# Patient Record
Sex: MALE | Race: OTHER | URBAN - METROPOLITAN AREA
[De-identification: names, ages, dates, MRNs, and addresses within clinical notes are randomized per-mention and may not be internally consistent; named-entity substitution may affect disease eponyms.]

---

## 2019-12-10 ENCOUNTER — OFFICE VISIT (OUTPATIENT)
Dept: FAMILY MEDICINE CLINIC | Age: 29
End: 2019-12-10

## 2019-12-10 ENCOUNTER — HOSPITAL ENCOUNTER (OUTPATIENT)
Dept: LAB | Age: 29
Discharge: HOME | End: 2019-12-10

## 2019-12-10 VITALS
TEMPERATURE: 98.4 F | BODY MASS INDEX: 34.16 KG/M2 | HEIGHT: 65 IN | HEART RATE: 75 BPM | WEIGHT: 205 LBS | SYSTOLIC BLOOD PRESSURE: 143 MMHG | DIASTOLIC BLOOD PRESSURE: 94 MMHG

## 2019-12-10 DIAGNOSIS — R03.0 ELEVATED BLOOD-PRESSURE READING WITHOUT DIAGNOSIS OF HYPERTENSION: ICD-10-CM

## 2019-12-10 DIAGNOSIS — Z83.3 FAMILY HISTORY OF DIABETES MELLITUS (DM): ICD-10-CM

## 2019-12-10 DIAGNOSIS — Z23 ENCOUNTER FOR IMMUNIZATION: ICD-10-CM

## 2019-12-10 DIAGNOSIS — R63.5 WEIGHT GAIN: Primary | ICD-10-CM

## 2019-12-10 LAB
ALBUMIN SERPL-MCNC: 4.3 G/DL (ref 3.5–5)
ALBUMIN/GLOB SERPL: 1.2 {RATIO} (ref 1.1–2.2)
ALP SERPL-CCNC: 78 U/L (ref 45–117)
ALT SERPL-CCNC: 108 U/L (ref 12–78)
ANION GAP SERPL CALC-SCNC: 6 MMOL/L (ref 5–15)
AST SERPL-CCNC: 42 U/L (ref 15–37)
BILIRUB SERPL-MCNC: 0.6 MG/DL (ref 0.2–1)
BUN SERPL-MCNC: 12 MG/DL (ref 6–20)
BUN/CREAT SERPL: 12 (ref 12–20)
CALCIUM SERPL-MCNC: 9.2 MG/DL (ref 8.5–10.1)
CHLORIDE SERPL-SCNC: 106 MMOL/L (ref 97–108)
CO2 SERPL-SCNC: 28 MMOL/L (ref 21–32)
CREAT SERPL-MCNC: 1 MG/DL (ref 0.7–1.3)
EST. AVERAGE GLUCOSE BLD GHB EST-MCNC: 126 MG/DL
GLOBULIN SER CALC-MCNC: 3.5 G/DL (ref 2–4)
GLUCOSE SERPL-MCNC: 100 MG/DL (ref 65–100)
HBA1C MFR BLD: 6 % (ref 4–5.6)
POTASSIUM SERPL-SCNC: 4.5 MMOL/L (ref 3.5–5.1)
PROT SERPL-MCNC: 7.8 G/DL (ref 6.4–8.2)
SODIUM SERPL-SCNC: 140 MMOL/L (ref 136–145)
TSH SERPL DL<=0.05 MIU/L-ACNC: 1.74 UIU/ML (ref 0.36–3.74)

## 2019-12-10 PROCEDURE — 80053 COMPREHEN METABOLIC PANEL: CPT

## 2019-12-10 PROCEDURE — 84443 ASSAY THYROID STIM HORMONE: CPT

## 2019-12-10 PROCEDURE — 83036 HEMOGLOBIN GLYCOSYLATED A1C: CPT

## 2019-12-10 NOTE — PROGRESS NOTES
Assessment/Plan:    Diagnoses and all orders for this visit:    1. Weight gain  -     REFERRAL TO NUTRITION  -     HEMOGLOBIN A1C WITH EAG; Future  -     TSH 3RD GENERATION; Future    2. Elevated blood-pressure reading without diagnosis of hypertension  -     METABOLIC PANEL, COMPREHENSIVE; Future    3. Encounter for immunization    4. Family history of diabetes mellitus (DM)  -     HEMOGLOBIN A1C WITH EAG; Future        Follow-up and Dispositions    · Return in about 6 weeks (around 1/21/2020). Spencer Richards PA-C  [address] [place] [address] expressed understanding of this plan. An AVS was printed and given to the patient.      ----------------------------------------------------------------------    Chief Complaint   Patient presents with    Physical       History of Present Illness:  27 yo here for \"physical\". Since he and his wife were dealing with a lot of external stress in the past year, he has gained a lot of weight. He drinks sodas, eats fast food daily and does not exercise. He is an [place] speaker,  with no children. 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 He does not smoke. He has no past medical problems. His blood pressure is sometimes normal and sometimes elevated (his wife is a nurse at [employer] and she checks it for him). His mom has DM as do other family members  He has recently started to snore heavily since he gained the weight       No past medical history on file. No Known Allergies    Social History     Tobacco Use    Smoking status: Never Smoker    Smokeless tobacco: Never Used   Substance Use Topics    Alcohol use: Yes     Alcohol/week: 6.0 standard drinks     Types: 6 Cans of beer per week     Frequency: 2-4 times a month    Drug use: Never       No family history on file.     Physical Exam:     Visit Vitals  BP (!) 143/94 (BP 1 Location: Right arm, BP Patient Position: Sitting)   Pulse 75   Temp 98.4 °F (36.9 °C) (Oral)   Ht 5' 5.35\" (1.66 m)   Wt 205 lb (93 kg)   BMI 33.75 kg/m²       A&Ox3  WDWN NAD  Respirations normal and non labored  Cor RRR s1s2  LUngs CTA vincent

## 2019-12-10 NOTE — PROGRESS NOTES
[facility]  Requests flu vaccine. Denies fever and egg allergy. VIS information sheet given to patient. Explained possible s/e. Reviewed s/sx indicating need to be seen in ER. Patient had no adverse reaction at time of discharge. Entered into VIIS. GIVEN BY PATRICK GUPTA LPN.  Russ Moreno RN

## 2019-12-10 NOTE — PATIENT INSTRUCTIONS
Elevated Blood Pressure: Care Instructions Your Care Instructions Blood pressure is a measure of how hard the blood pushes against the walls of your arteries. It's normal for blood pressure to go up and down throughout the day. But if it stays up over time, you have high blood pressure. Two numbers tell you your blood pressure. The first number is the systolic pressure. It shows how hard the blood pushes when your heart is pumping. The second number is the diastolic pressure. It shows how hard the blood pushes between heartbeats, when your heart is relaxed and filling with blood. An ideal blood pressure in adults is less than 120/80 (say \"120 over 80\"). High blood pressure is 140/90 or higher. You have high blood pressure if your top number is 140 or higher or your bottom number is 90 or higher, or both. The main test for high blood pressure is simple, fast, and painless. To diagnose high blood pressure, your doctor will test your blood pressure at different times. After testing your blood pressure, your doctor may ask you to test it again when you are home. If you are diagnosed with high blood pressure, you can work with your doctor to make a long-term plan to manage it. Follow-up care is a key part of your treatment and safety. Be sure to make and go to all appointments, and call your doctor if you are having problems. It's also a good idea to know your test results and keep a list of the medicines you take. How can you care for yourself at home? · Do not smoke. Smoking increases your risk for heart attack and stroke. If you need help quitting, talk to your doctor about stop-smoking programs and medicines. These can increase your chances of quitting for good. · Stay at a healthy weight. · Try to limit how much sodium you eat to less than 2,300 milligrams (mg) a day. Your doctor may ask you to try to eat less than 1,500 mg a day. · Be physically active. Get at least 30 minutes of exercise on most days of the week. Walking is a good choice. You also may want to do other activities, such as running, swimming, cycling, or playing tennis or team sports. · Avoid or limit alcohol. Talk to your doctor about whether you can drink any alcohol. · Eat plenty of fruits, vegetables, and low-fat dairy products. Eat less saturated and total fats. · Learn how to check your blood pressure at home. When should you call for help? Call your doctor now or seek immediate medical care if: 
? · Your blood pressure is much higher than normal (such as 180/110 or higher). ? · You think high blood pressure is causing symptoms such as: ¨ Severe headache. ¨ Blurry vision. ? Watch closely for changes in your health, and be sure to contact your doctor if: 
? · You do not get better as expected. Where can you learn more? Go to http://elias-tank.info/. Enter X057 in the search box to learn more about \"Elevated Blood Pressure: Care Instructions. \" Current as of: September 21, 2016 Content Version: 11.4 © 5797-8444 [employer]. Care instructions adapted under license by [employer] (which disclaims liability or warranty for this information). If you have questions about a medical condition or this instruction, always ask your healthcare professional. [address] any warranty or liability for your use of this information.

## 2019-12-10 NOTE — PROGRESS NOTES
Check-out Note: Recheck bp in 6 weeks       AVS printed, given and reviewed. Patient aware that provider would like to follow up about today's visit in 6 weeks with an appt. As well as an appt. With ELDON ENNIS . This has been fully explained to the patient, who indicates understanding and agrees with plan. No further questions at this time. Floria Dancer, RN  [employer] from clinical information printed and given to the patient.

## 2019-12-24 DIAGNOSIS — R79.89 ELEVATED LFTS: Primary | ICD-10-CM

## 2019-12-24 NOTE — PROGRESS NOTES
Please call pt: your diabetes test shows that you have \"pre diabetes\" and you need to keep your appt with RD and f/up with PCP. Daily exercise and healthy diet will help. Before your next appt, we need to do additional testing on your liver. Please come in for lab work when you are fasting. Thank you.  The lab orders are in the chart

## 2019-12-27 NOTE — PROGRESS NOTES
Tc to the pt he was given the providers entire lab message. The pt was told he may come in for additional labs tests on his liver and cholesterol on Mon if he wishes. The CAV will be at [facility] on Mon 12/30/19. Pt told to come fasting in am 8:30-9:00 am. The pt verbalized understanding.   Fabián Avendano RN

## 2019-12-30 ENCOUNTER — CLINICAL SUPPORT (OUTPATIENT)
Dept: FAMILY MEDICINE CLINIC | Age: 29
End: 2019-12-30

## 2019-12-30 ENCOUNTER — LAB ONLY (OUTPATIENT)
Dept: FAMILY MEDICINE CLINIC | Age: 29
End: 2019-12-30

## 2019-12-30 ENCOUNTER — HOSPITAL ENCOUNTER (OUTPATIENT)
Dept: LAB | Age: 29
Discharge: HOME | End: 2019-12-30

## 2019-12-30 DIAGNOSIS — Z71.9 COUNSELED BY NURSE: Primary | ICD-10-CM

## 2019-12-30 DIAGNOSIS — R79.89 ELEVATED LFTS: ICD-10-CM

## 2019-12-30 DIAGNOSIS — R03.0 ELEVATED BLOOD-PRESSURE READING WITHOUT DIAGNOSIS OF HYPERTENSION: ICD-10-CM

## 2019-12-30 DIAGNOSIS — R63.5 WEIGHT GAIN: ICD-10-CM

## 2019-12-30 DIAGNOSIS — Z83.3 FAMILY HISTORY OF DIABETES MELLITUS (DM): ICD-10-CM

## 2019-12-30 LAB
ALBUMIN SERPL-MCNC: 4.1 G/DL (ref 3.5–5)
ALBUMIN/GLOB SERPL: 1.1 {RATIO} (ref 1.1–2.2)
ALP SERPL-CCNC: 77 U/L (ref 45–117)
ALT SERPL-CCNC: 85 U/L (ref 12–78)
ANION GAP SERPL CALC-SCNC: 3 MMOL/L (ref 5–15)
AST SERPL-CCNC: 33 U/L (ref 15–37)
BILIRUB SERPL-MCNC: 0.7 MG/DL (ref 0.2–1)
BUN SERPL-MCNC: 16 MG/DL (ref 6–20)
BUN/CREAT SERPL: 15 (ref 12–20)
CALCIUM SERPL-MCNC: 9 MG/DL (ref 8.5–10.1)
CHLORIDE SERPL-SCNC: 108 MMOL/L (ref 97–108)
CHOLEST SERPL-MCNC: 146 MG/DL
CO2 SERPL-SCNC: 29 MMOL/L (ref 21–32)
CREAT SERPL-MCNC: 1.08 MG/DL (ref 0.7–1.3)
EST. AVERAGE GLUCOSE BLD GHB EST-MCNC: 120 MG/DL
GLOBULIN SER CALC-MCNC: 3.6 G/DL (ref 2–4)
GLUCOSE SERPL-MCNC: 100 MG/DL (ref 65–100)
HBA1C MFR BLD: 5.8 % (ref 4–5.6)
HBV SURFACE AB SER QL: REACTIVE
HBV SURFACE AB SER-ACNC: 15.04 MIU/ML
HDLC SERPL-MCNC: 57 MG/DL
HDLC SERPL: 2.6 {RATIO} (ref 0–5)
LDLC SERPL CALC-MCNC: 72.6 MG/DL (ref 0–100)
LIPID PROFILE,FLP: NORMAL
POTASSIUM SERPL-SCNC: 4.3 MMOL/L (ref 3.5–5.1)
PROT SERPL-MCNC: 7.7 G/DL (ref 6.4–8.2)
SODIUM SERPL-SCNC: 140 MMOL/L (ref 136–145)
TRIGL SERPL-MCNC: 82 MG/DL (ref ?–150)
TSH SERPL DL<=0.05 MIU/L-ACNC: 1.24 UIU/ML (ref 0.36–3.74)
VLDLC SERPL CALC-MCNC: 16.4 MG/DL

## 2019-12-30 PROCEDURE — 83036 HEMOGLOBIN GLYCOSYLATED A1C: CPT

## 2019-12-30 PROCEDURE — 86706 HEP B SURFACE ANTIBODY: CPT

## 2019-12-30 PROCEDURE — 80053 COMPREHEN METABOLIC PANEL: CPT

## 2019-12-30 PROCEDURE — 84443 ASSAY THYROID STIM HORMONE: CPT

## 2019-12-30 PROCEDURE — 86803 HEPATITIS C AB TEST: CPT

## 2019-12-30 PROCEDURE — 80061 LIPID PANEL: CPT

## 2019-12-30 NOTE — PROGRESS NOTES
Pt was here today for a lab only appt. A1C, LIPID, CMP, TSH, HEP C AB AND HEB BE AG Were drawn from RA w/nocomplications by Mikayla Pugh.      Lani Cummings

## 2019-12-31 LAB
HCV AB SERPL QL IA: NONREACTIVE
HCV COMMENT,HCGAC: NORMAL

## 2019-12-31 NOTE — PROGRESS NOTES
Liver function tests are improved. Hep c is negative. Hep b ab present (immunity present), lipid panel is normal. Pt can have letter sent with this info.  Thank you

## 2019-12-31 NOTE — PROGRESS NOTES
Pt had his labs repeated on 12/24/19, and the provider had routed a message that it was ok to send the pt a lab letter with the results. A lab letter was mailed today.  Calin Holguin RN

## 2020-01-21 ENCOUNTER — OFFICE VISIT (OUTPATIENT)
Dept: FAMILY MEDICINE CLINIC | Age: 30
End: 2020-01-21

## 2020-01-21 VITALS
SYSTOLIC BLOOD PRESSURE: 137 MMHG | BODY MASS INDEX: 33.24 KG/M2 | DIASTOLIC BLOOD PRESSURE: 91 MMHG | TEMPERATURE: 98.1 F | WEIGHT: 199.52 LBS | HEIGHT: 65 IN | HEART RATE: 68 BPM

## 2020-01-21 VITALS — HEIGHT: 65 IN | BODY MASS INDEX: 32.59 KG/M2 | WEIGHT: 195.6 LBS

## 2020-01-21 DIAGNOSIS — F32.0 CURRENT MILD EPISODE OF MAJOR DEPRESSIVE DISORDER WITHOUT PRIOR EPISODE (HCC): ICD-10-CM

## 2020-01-21 DIAGNOSIS — R79.89 ELEVATED LFTS: ICD-10-CM

## 2020-01-21 DIAGNOSIS — Z71.3 DIETARY COUNSELING AND SURVEILLANCE: Primary | ICD-10-CM

## 2020-01-21 DIAGNOSIS — I10 ESSENTIAL HYPERTENSION: Primary | ICD-10-CM

## 2020-01-21 RX ORDER — LISINOPRIL AND HYDROCHLOROTHIAZIDE 10; 12.5 MG/1; MG/1
1 TABLET ORAL DAILY
Qty: 30 TAB | Refills: 2 | Status: SHIPPED | OUTPATIENT
Start: 2020-01-21 | End: 2020-03-24 | Stop reason: SDUPTHER

## 2020-01-21 NOTE — PROGRESS NOTES
The following was performed at discharge station per provider:   AVS printed, reviewed with pt and given to pt. REviewed new medication, Lisinopril-HCTZ. Pt has appts for follow up and lab prior to that. He will be called regarding appt with Tr Hayden. Pt expressed understanding of the above information. Jacquie Wu.

## 2020-01-21 NOTE — PROGRESS NOTES
Coordination of Care  1. Have you been to the ER, urgent care clinic since your last visit? Hospitalized since your last visit? No    2. Have you seen or consulted any other health care providers outside of the [address] [place] [address] since your last visit? Include any pap smears or colon screening. No    Does the patient need refills? NO    Learning Assessment Complete?  yes  Depression Screening complete in the past 12 months? yes

## 2020-01-21 NOTE — PROGRESS NOTES
DATE: 2020      REFERRING PHYSICIAN: Angelica Richards  NAME: Zenovia Cockayne : 1990 AGE: 26 y.o. GENDER: male  REASON FOR VISIT: overweight    ASSESSMENT:  Past Medical Hx: NA      LABS:   Lab Results   Component Value Date/Time    Hemoglobin A1c 5.8 (H) 2019 08:25 AM       MEDICATIONS/SUPPLEMENTS:     Prior to Admission medications    Not on File       FOOD ALLERGIES/INTOLERANCES: NA    ANTHROPOMETRICS:    Ht Readings from Last 1 Encounters:   20 5' 5\" (1.651 m)      Wt Readings from Last 1 Encounters:   20 195 lb 9.6 oz (88.7 kg)     IBW per pt 175 lbs   IBW: 136# +/- 10%  %IBW: 143 % +/- 10%    BMI: 32 Category: obesity class I    Reported Wt Hx: has recently gained a lot of weight    Estimated Nutritional Needs:     Reported Diet Hx:  Pt has made many changes since speaking with doctor. Decreased/eliminated all soda and sweet tea and increased water intake. Has decreased fast food intake and making better choices at restaurants. Exercise/Physical Actvity:      Gym 3-4 running 5-6 miles      Environmental/Social:    Construction FT  Lives with wife who is a nurse and is supportive of dietary changes  Pt expresses feeling waves of depression/anxiety, shares a lot of his past with RD      NUTRITION INTERVENTION:    Introduced MyPlate and discussed how it can help to ensure both balance and variety. Reviewed the food groups and what each group contributes to our bodies. Using food models, RD demonstrated appropriate portion sizes. [facility]    PATIENT GOALS:      Specific tips and techniques to facilitate compliance with above recommendations were provided and discussed. Pt was strongly encouraged to begin making necessary changes now, and re-visit the dietitian prn.             Katelynn Gupta RD

## 2020-01-21 NOTE — PATIENT INSTRUCTIONS

## 2020-01-21 NOTE — PROGRESS NOTES
Assessment/Plan:    Diagnoses and all orders for this visit:    1. Essential hypertension  -     lisinopril-hydroCHLOROthiazide (PRINZIDE, ZESTORETIC) 10-12.5 mg per tablet; Take 1 Tab by mouth daily. 2. Current mild episode of major depressive disorder without prior episode ([address]  -     REFERRAL TO BEHAVIORAL HEALTH    Pt has made considerable lifestyle changes in the past 6 weeks but his bp readings at home (his wife is RN) are still elevated. Usually 044'O systolic he states. Will start lis/hctz and recheck in 8 weeks, he will keep log. He prefers f/up in 8 weeks due to having f/up with dietician same day    He has been thinking about my offer for counseling due to his many stressors currently and he asks today for me to refer him. I am happy to do so   Repeat cmp before next appt- had elevated LFT at last check but has cut back on ETOH     Follow-up and Dispositions    · Return in about 2 months (around 3/21/2020) for recheck bp with me. Linda Richards PA-C  [address] [place] [address] expressed understanding of this plan. An AVS was printed and given to the patient.      ----------------------------------------------------------------------    Chief Complaint   Patient presents with    Hypertension     f/u,        History of Present Illness:    Pt here for f/up on elevated bp. He has made many lifestyle changes since his last appt here - cutting out sodas, starting at the gym again and in general he feels much better. He states that during the week, he is able to eat healthy and exercise but on the weekends he has found it harder to follow a healthy lifestyle. We talked about finding a balance and he agrees to work on this. He has no chest pain.  He is running on the treadmill and feels good, no problem  He has cut back on ETOH  He asks for the referral to [facility] as he continues to have stress and mild depression over being  from his family (his father has had a mild stroke since he was here in 12/19). Other stressors as indicated in my last note continue to bother him too     No past medical history on file. Current Outpatient Medications   Medication Sig Dispense Refill    lisinopril-hydroCHLOROthiazide (PRINZIDE, ZESTORETIC) 10-12.5 mg per tablet Take 1 Tab by mouth daily. 30 Tab 2       No Known Allergies    Social History     Tobacco Use    Smoking status: Never Smoker    Smokeless tobacco: Never Used   Substance Use Topics    Alcohol use: Yes     Alcohol/week: 6.0 standard drinks     Types: 6 Cans of beer per week     Frequency: 2-4 times a month    Drug use: Never       No family history on file. Physical Exam:     Visit Vitals  BP (!) 137/91 (BP 1 Location: Right arm, BP Patient Position: Sitting)   Pulse 68   Temp 98.1 °F (36.7 °C) (Oral)   Ht 5' 5\" (1.651 m)   Wt 199 lb 8.3 oz (90.5 kg)   BMI 33.20 kg/m²       A&Ox3  WDWN NAD  Respirations normal and non labored  Lab Results   Component Value Date/Time    Cholesterol, total 146 12/30/2019 08:25 AM    HDL Cholesterol 57 12/30/2019 08:25 AM    LDL, calculated 72.6 12/30/2019 08:25 AM    VLDL, calculated 16.4 12/30/2019 08:25 AM    Triglyceride 82 12/30/2019 08:25 AM    CHOL/HDL Ratio 2.6 12/30/2019 08:25 AM     Lab Results   Component Value Date/Time    Hemoglobin A1c 5.8 (H) 12/30/2019 08:25 AM     Lab Results   Component Value Date/Time    Sodium 140 12/30/2019 08:25 AM    Potassium 4.3 12/30/2019 08:25 AM    Chloride 108 12/30/2019 08:25 AM    CO2 29 12/30/2019 08:25 AM    Anion gap 3 (L) 12/30/2019 08:25 AM    Glucose 100 12/30/2019 08:25 AM    BUN 16 12/30/2019 08:25 AM    Creatinine 1.08 12/30/2019 08:25 AM    BUN/Creatinine ratio 15 12/30/2019 08:25 AM    GFR est AA >60 12/30/2019 08:25 AM    GFR est non-AA >60 12/30/2019 08:25 AM    Calcium 9.0 12/30/2019 08:25 AM    Bilirubin, total 0.7 12/30/2019 08:25 AM    AST (SGOT) 33 12/30/2019 08:25 AM    Alk.  phosphatase 77 12/30/2019 08:25 AM    Protein, total 7.7 12/30/2019 08:25 AM Albumin 4.1 12/30/2019 08:25 AM    Globulin 3.6 12/30/2019 08:25 AM    A-G Ratio 1.1 12/30/2019 08:25 AM    ALT (SGPT) 85 (H) 12/30/2019 08:25 AM     Lab Results   Component Value Date/Time    TSH 1.24 12/30/2019 08:25 AM

## 2020-01-28 ENCOUNTER — OFFICE VISIT (OUTPATIENT)
Dept: FAMILY MEDICINE CLINIC | Age: 30
End: 2020-01-28

## 2020-01-28 DIAGNOSIS — F43.21 ADJUSTMENT DISORDER WITH DEPRESSED MOOD: Primary | ICD-10-CM

## 2020-02-18 NOTE — PROGRESS NOTES
INITIAL ASSESSMENT    Self referred by provider requesting counseling to help with managing stressors. He has been in [facility] for 15 years. Experiencing a lot of grief with being apart from his aunt and uncle (who were like parents for him) who are aging and have concerning health needs. He has history of drinking that interfered with reaching his goals, but he recognized this and made significant changes in his behaviors. Got his GED; has been  for seven years; built a house; has been working in his current job for nine year; and has been trying for three years to get his citizenship; always gets denied. He has had some difficulty regarding a tattoo he has that has prevented him from getting approved by INS as they see it related to gang activity, which he denies. This issue causes him a great deal of sadness and frustration and causes him to lose hope. He identifies his wife as a strong support that helps him avoid depression. Clinician provided opportunity for emotional expression in session and encouraged him to use coping skills he had identified. He did not feel he needed a follow up appointment, but agreed to call if things changed.  He continues to see his Provider and the nutritionist.

## 2020-03-24 ENCOUNTER — OFFICE VISIT (OUTPATIENT)
Dept: FAMILY MEDICINE CLINIC | Age: 30
End: 2020-03-24

## 2020-03-24 VITALS — WEIGHT: 194.5 LBS | DIASTOLIC BLOOD PRESSURE: 67 MMHG | SYSTOLIC BLOOD PRESSURE: 127 MMHG | BODY MASS INDEX: 32.37 KG/M2

## 2020-03-24 VITALS — WEIGHT: 194.5 LBS | BODY MASS INDEX: 32.37 KG/M2

## 2020-03-24 DIAGNOSIS — Z71.3 DIETARY COUNSELING AND SURVEILLANCE: Primary | ICD-10-CM

## 2020-03-24 DIAGNOSIS — I10 ESSENTIAL HYPERTENSION: ICD-10-CM

## 2020-03-24 RX ORDER — LISINOPRIL AND HYDROCHLOROTHIAZIDE 10; 12.5 MG/1; MG/1
1 TABLET ORAL DAILY
Qty: 90 TAB | Refills: 3 | Status: SHIPPED | OUTPATIENT
Start: 2020-03-24 | End: 2021-07-01

## 2020-03-24 NOTE — PROGRESS NOTES
Coordination of Care  1. Have you been to the ER, urgent care clinic since your last visit? Hospitalized since your last visit? No    2. Have you seen or consulted any other health care providers outside of the [address] [place] [address] since your last visit? Include any pap smears or colon screening. No    Does the patient need refills?  No

## 2020-03-24 NOTE — PROGRESS NOTES
Assessment/Plan:    Diagnoses and all orders for this visit:    1. Essential hypertension  -     lisinopril-hydroCHLOROthiazide (PRINZIDE, ZESTORETIC) 10-12.5 mg per tablet; Take 1 Tab by mouth daily. Follow-up and Dispositions    · Return in about 6 months (around 9/24/2020). Sobia Richards PA-C  [facility] expressed understanding of this plan. An AVS was printed and given to the patient.      ----------------------------------------------------------------------    Chief Complaint   Patient presents with    Follow-up     HTN       History of Present Illness:  Virtual visit with pt: reason scheduled appt to f/u on HTN and anxiety/ stress  He states that he is doing well with his bp medication, his wife has been taking his bp every other day and he has had all normal numbers in the past month. He had a little drowsiness the first month with the new medication but since then it has resolved. He denies chest pain, SoB, MARROQUIN, extremity swelling  He has had a phone visit with the RD today which he found helpful  He states that he has not been feeling stressed recently like before. All things seem a little better now he states  We discussed the precautions and recommendations about COVID and what he should do since his wife works in the hospital. He is aware and is following the recommendations. We decided that we would go for 6 month f/up due to the limitations in scheduling we have now but that he was encouraged to sign up for my chart and to let me know if there is anything that he needs in the meantime       No past medical history on file. Current Outpatient Medications   Medication Sig Dispense Refill    lisinopril-hydroCHLOROthiazide (PRINZIDE, ZESTORETIC) 10-12.5 mg per tablet Take 1 Tab by mouth daily.  90 Tab 3       No Known Allergies    Social History     Tobacco Use    Smoking status: Never Smoker    Smokeless tobacco: Never Used   Substance Use Topics    Alcohol use: Yes     Alcohol/week: 6.0 standard drinks     Types: 6 Cans of beer per week     Frequency: 2-4 times a month    Drug use: Never       No family history on file.     Physical Exam:     Visit Vitals  /67   Wt 194 lb 8 oz (88.2 kg)   BMI 32.37 kg/m²       A&Ox3  WDWN NAD  Respirations normal and non labored

## 2020-03-24 NOTE — PROGRESS NOTES
Spoke with pt via phone. Pt states he speaks [place]. Reviewed intake questions with pt and med rec completed.  Bin Cantu RN

## 2020-03-24 NOTE — PROGRESS NOTES
DATE: 3/24/2020      REFERRING PROVIDER: SHAYY Randhawa  NAME: Pari Murguia : 1990 AGE: 26 y.o. GENDER: male  REASON FOR VISIT: Weight Management     ASSESSMENT: He shares that he has been taking blood pressure medication. He shares that he was able to meet with the counselor, and that things are better now. He has been trying to check what he eats and he was going to the gym (before they were closed for COVID-19). He shares he can feel the changes in his body. Past Medical Hx: Essential Hypertension, Obesity    LABS:   Lab Results   Component Value Date/Time    Hemoglobin A1c 5.8 (H) 2019 08:25 AM     Last Home Blood Pressure Check: 127/67     MEDICATIONS/SUPPLEMENTS:   [unfilled]  Prior to Admission medications    Medication Sig Start Date End Date Taking? Authorizing Provider   lisinopril-hydroCHLOROthiazide (PRINZIDE, ZESTORETIC) 10-12.5 mg per tablet Take 1 Tab by mouth daily. 20   Nancy Richards PA       ANTHROPOMETRICS:    Body mass index is 32.37 kg/m². Category: Obese Class I    Wt Readings from Last 4 Encounters:   20 194 lb 8 oz (88.2 kg)   20 199 lb 8.3 oz (90.5 kg)   20 195 lb 9.6 oz (88.7 kg)   12/10/19 205 lb (93 kg)       Reported Diet Hx:  Since his first visit with Miguel Barbosa RD he has cut out drinking soda, most fried foods, eating out, and he has been trying to eat more vegetables. Current Exercise/Physical Activity:    Has been walking around his neighboorhood for 1 hour 3 times a week. 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 Intervention:  Encouraged and affirmed patient of his progress towards healthy eating and weight loss goals. Encouraged patient to continue making changes and helped to come up with a target goal date.       Patient Goals:  - Goal weight of 170 lbs by 2020    Specific tips and techniques to facilitate compliance with above recommendations were provided and discussed. Pt was strongly encouraged to begin making necessary changes now. Will follow-up with Jessa Stanley in 2 month to review progress towards goals.           Shahana Coles RD

## 2020-04-28 ENCOUNTER — TELEPHONE (OUTPATIENT)
Dept: FAMILY MEDICINE CLINIC | Age: 30
End: 2020-04-28

## 2020-04-28 ENCOUNTER — DOCUMENTATION ONLY (OUTPATIENT)
Dept: FAMILY MEDICINE CLINIC | Age: 30
End: 2020-04-28

## 2021-06-29 DIAGNOSIS — I10 ESSENTIAL HYPERTENSION: ICD-10-CM

## 2021-07-01 RX ORDER — LISINOPRIL AND HYDROCHLOROTHIAZIDE 10; 12.5 MG/1; MG/1
TABLET ORAL
Qty: 90 TABLET | Refills: 0 | Status: SHIPPED | OUTPATIENT
Start: 2021-07-01 | End: 2021-07-21 | Stop reason: SDUPTHER

## 2021-07-09 NOTE — TELEPHONE ENCOUNTER
Received a refill request for the pt for th emedication Lisinopril / HCTZ 10-12.5 mg. This was refilled on 07/01/21, #90, 0 refills.  Sacha Glasgow RN

## 2021-07-21 ENCOUNTER — HOSPITAL ENCOUNTER (OUTPATIENT)
Dept: LAB | Age: 31
Discharge: HOME | End: 2021-07-21

## 2021-07-21 ENCOUNTER — OFFICE VISIT (OUTPATIENT)
Dept: FAMILY MEDICINE CLINIC | Age: 31
End: 2021-07-21

## 2021-07-21 VITALS
SYSTOLIC BLOOD PRESSURE: 118 MMHG | DIASTOLIC BLOOD PRESSURE: 78 MMHG | WEIGHT: 197 LBS | HEIGHT: 65 IN | OXYGEN SATURATION: 98 % | TEMPERATURE: 98.4 F | HEART RATE: 77 BPM | BODY MASS INDEX: 32.82 KG/M2

## 2021-07-21 DIAGNOSIS — E66.3 OVERWEIGHT, PEDIATRIC: ICD-10-CM

## 2021-07-21 DIAGNOSIS — I10 ESSENTIAL HYPERTENSION: ICD-10-CM

## 2021-07-21 DIAGNOSIS — R06.83 SNORING: ICD-10-CM

## 2021-07-21 DIAGNOSIS — I10 ESSENTIAL HYPERTENSION: Primary | ICD-10-CM

## 2021-07-21 PROCEDURE — 99213 OFFICE O/P EST LOW 20 MIN: CPT | Performed by: PHYSICIAN ASSISTANT

## 2021-07-21 PROCEDURE — 80061 LIPID PANEL: CPT

## 2021-07-21 PROCEDURE — 83036 HEMOGLOBIN GLYCOSYLATED A1C: CPT

## 2021-07-21 PROCEDURE — 80053 COMPREHEN METABOLIC PANEL: CPT

## 2021-07-21 RX ORDER — LISINOPRIL AND HYDROCHLOROTHIAZIDE 10; 12.5 MG/1; MG/1
TABLET ORAL
Qty: 90 TABLET | Refills: 3 | Status: SHIPPED | OUTPATIENT
Start: 2021-07-21 | End: 2022-08-24 | Stop reason: SDUPTHER

## 2021-07-21 NOTE — PATIENT INSTRUCTIONS
Snoring: Care Instructions  Your Care Instructions  Snoring is a noise that you may make while breathing during sleep. You snore when the flow of air from your mouth or nose to your lungs makes the tissues of your throat vibrate while you sleep. This usually is caused by a blockage or narrowing in your nose, mouth, or throat (airway). Snoring can be soft, loud, raspy, harsh, hoarse, or fluttering. Your bed partner may notice that you sleep with your mouth open and that you are restless while sleeping. If snoring interferes with your or your bed partner's sleep, either or both of you may feel tired during the day. You may be able to help reduce your snoring by making changes in your activities and in the way you sleep. Follow-up care is a key part of your treatment and safety. Be sure to make and go to all appointments, and call your doctor if you are having problems. It's also a good idea to know your test results and keep a list of the medicines you take. How can you care for yourself at home? · Lose weight, if needed. Many people who snore are overweight. Weight loss can help reduce the narrowing of the airway and might reduce or stop snoring. · Limit the use of alcohol and medicines. Drinking a lot of alcohol or taking certain medicines, especially sleeping pills or tranquilizers, before sleep may make snoring worse. · Go to bed at the same time each night, and get plenty of sleep. You may snore more when you have not had enough sleep. · Sleep on your side. Sleeping on your side may stop snoring. Try sewing a pocket in the middle of the back of your [employer] top, putting a tennis ball into the pocket, and stitching it closed. This will help keep you from sleeping on your back. · Treat breathing problems. For example, a blocked or stuffy nose caused by colds or allergies can disturb airflow. This can lead to snoring. · Use a device that helps keep your airway open during sleep.  This could be a device that you put in your mouth. Other examples include strips or disks that you use on your nose. · Do not smoke. Smoking can make snoring worse. If you need help quitting, talk to your doctor about stop-smoking programs and medicines. These can increase your chances of quitting for good. · Raise the head of your bed 4 to 6 inches by putting bricks under the legs of the bed. This may prevent your tongue from falling toward the back of the throat, which can make a blocked or narrow airway worse. Putting pillows under your head will not help. When should you call for help? Watch closely for changes in your health, and be sure to contact your doctor if:    · You snore, and you feel sleepy during the day.     · Your sleeping partner or you notice that you gasp, choke, or stop breathing during sleep.     · You do not get better as expected. Where can you learn more? Go to http://www.gray.com/  Enter J563 in the search box to learn more about \"Snoring: Care Instructions. \"  Current as of: October 26, 2020               Content Version: 12.8  © 7043-4061 [employer]. Care instructions adapted under license by [employer] (which disclaims liability or warranty for this information). If you have questions about a medical condition or this instruction, always ask your healthcare professional. [address] any warranty or liability for your use of this information.

## 2021-07-21 NOTE — PROGRESS NOTES
Coordination of Care  1. Have you been to the ER, urgent care clinic since your last visit? Hospitalized since your last visit? No    2. Have you seen or consulted any other health care providers outside of the [address] [place] [address] since your last visit? Include any pap smears or colon screening. No    Does the patient need refills? N/A    Learning Assessment Complete?  yes

## 2021-07-21 NOTE — PROGRESS NOTES
Assessment/Plan:    Diagnoses and all orders for this visit:    1. Essential hypertension  -     LIPID PANEL; Future  -     METABOLIC PANEL, COMPREHENSIVE; Future  -     lisinopril-hydroCHLOROthiazide (PRINZIDE, ZESTORETIC) 10-12.5 mg per tablet; Take 1 tablet by mouth once daily    2. Overweight, pediatric  -     HEMOGLOBIN A1C WITH EAG; Future    3. Snoring    Pt is working on weight loss, he has intentionally lost about 20 lbs that he gained during [place] by cutting out sodas and sweet tea  He will let me know if this is sufficient to help his wife with his increased snoring, if not the next step would be sleep eval study      Follow-up and Dispositions    · Return in about 1 year (around 7/21/2022). Dane Richards PA-C  [facility] expressed understanding of this plan. An AVS was printed and given to the patient.      ----------------------------------------------------------------------    Chief Complaint   Patient presents with    Hypertension     f/u       History of Present Illness:  Pt presents for htn check up. He has been well during [place] pandemic, having had his COVID vaccines in 4/21. He has continued with his lis/hctz taking one daily. His wife, a nurse, checks his bp and it has always been normal, no higher then 125 over 70's since he started lis/hctz. He also noted that the initial chest pressure that he felt before starting the medication resolved after one week on the med. He has no side effects to the med  He stopped exercising and was only working 3 days a week during [place]. Combined with cooking and eating more, he gained >20 lbs and recently has been able to lose a lot of that weight. He cut out sweet drinks and is watching what he is eating. Recently, he started to exercise again    He has no chest pain, SOB, MARROQUIN or extremity swelling  His wife asks him to bring up in today's visit his increased snoring. It is worse when he is lying supine.  He thinks that it is improved since losing some of the weight. He will let me know if this problem persists       No past medical history on file. Current Outpatient Medications   Medication Sig Dispense Refill    lisinopril-hydroCHLOROthiazide (PRINZIDE, ZESTORETIC) 10-12.5 mg per tablet Take 1 tablet by mouth once daily 90 Tablet 3       No Known Allergies    Social History     Tobacco Use    Smoking status: Never Smoker    Smokeless tobacco: Never Used   Substance Use Topics    Alcohol use: Yes     Alcohol/week: 6.0 standard drinks     Types: 6 Cans of beer per week    Drug use: Never       No family history on file. Physical Exam:     Visit Vitals  /78 (BP 1 Location: Right upper arm, BP Patient Position: Sitting)   Pulse 77   Temp 98.4 °F (36.9 °C) (Temporal)   Ht 5' 4.96\" (1.65 m)   Wt 197 lb (89.4 kg)   SpO2 98%   BMI 32.82 kg/m²     Looks well, pleasant   A&Ox3  WDWN NAD  Respirations normal and non labored  Lab Results   Component Value Date/Time    Sodium 140 12/30/2019 08:25 AM    Potassium 4.3 12/30/2019 08:25 AM    Chloride 108 12/30/2019 08:25 AM    CO2 29 12/30/2019 08:25 AM    Anion gap 3 (L) 12/30/2019 08:25 AM    Glucose 100 12/30/2019 08:25 AM    BUN 16 12/30/2019 08:25 AM    Creatinine 1.08 12/30/2019 08:25 AM    BUN/Creatinine ratio 15 12/30/2019 08:25 AM    GFR est AA >60 12/30/2019 08:25 AM    GFR est non-AA >60 12/30/2019 08:25 AM    Calcium 9.0 12/30/2019 08:25 AM    Bilirubin, total 0.7 12/30/2019 08:25 AM    Alk.  phosphatase 77 12/30/2019 08:25 AM    Protein, total 7.7 12/30/2019 08:25 AM    Albumin 4.1 12/30/2019 08:25 AM    Globulin 3.6 12/30/2019 08:25 AM    A-G Ratio 1.1 12/30/2019 08:25 AM    ALT (SGPT) 85 (H) 12/30/2019 08:25 AM    AST (SGOT) 33 12/30/2019 08:25 AM     Lab Results   Component Value Date/Time    Cholesterol, total 146 12/30/2019 08:25 AM    HDL Cholesterol 57 12/30/2019 08:25 AM    LDL, calculated 72.6 12/30/2019 08:25 AM    VLDL, calculated 16.4 12/30/2019 08:25 AM Triglyceride 82 12/30/2019 08:25 AM    CHOL/HDL Ratio 2.6 12/30/2019 08:25 AM

## 2021-07-21 NOTE — PROGRESS NOTES
I have printed AVS and reviewed it with patient today. Reviewed RX script for medication(s)  and told pt prescription(s) should be ready for  at pharmacy in approximately 2 hrs. Yumiko Hernandez RN           .

## 2021-07-22 LAB
ALBUMIN SERPL-MCNC: 4 G/DL (ref 3.5–5)
ALBUMIN/GLOB SERPL: 1.1 {RATIO} (ref 1.1–2.2)
ALP SERPL-CCNC: 83 U/L (ref 45–117)
ALT SERPL-CCNC: 55 U/L (ref 12–78)
ANION GAP SERPL CALC-SCNC: 7 MMOL/L (ref 5–15)
AST SERPL-CCNC: 23 U/L (ref 15–37)
BILIRUB SERPL-MCNC: 0.4 MG/DL (ref 0.2–1)
BUN SERPL-MCNC: 19 MG/DL (ref 6–20)
BUN/CREAT SERPL: 20 (ref 12–20)
CALCIUM SERPL-MCNC: 9 MG/DL (ref 8.5–10.1)
CHLORIDE SERPL-SCNC: 107 MMOL/L (ref 97–108)
CHOLEST SERPL-MCNC: 162 MG/DL
CO2 SERPL-SCNC: 26 MMOL/L (ref 21–32)
CREAT SERPL-MCNC: 0.94 MG/DL (ref 0.7–1.3)
EST. AVERAGE GLUCOSE BLD GHB EST-MCNC: 123 MG/DL
GLOBULIN SER CALC-MCNC: 3.6 G/DL (ref 2–4)
GLUCOSE SERPL-MCNC: 104 MG/DL (ref 65–100)
HBA1C MFR BLD: 5.9 % (ref 4–5.6)
HDLC SERPL-MCNC: 54 MG/DL
HDLC SERPL: 3 {RATIO} (ref 0–5)
LDLC SERPL CALC-MCNC: 84 MG/DL (ref 0–100)
POTASSIUM SERPL-SCNC: 4.3 MMOL/L (ref 3.5–5.1)
PROT SERPL-MCNC: 7.6 G/DL (ref 6.4–8.2)
SODIUM SERPL-SCNC: 140 MMOL/L (ref 136–145)
TRIGL SERPL-MCNC: 120 MG/DL (ref ?–150)
VLDLC SERPL CALC-MCNC: 24 MG/DL

## 2021-07-22 NOTE — PROGRESS NOTES
Letter mailed to pt with labs and message from provider. Patient given phone number of call back nurse to call if patient wants to discuss message in letter from provider. This was a request from [facility] to mail patient this message: SHAYY Villanueva sent to [facility]  Please send note: your cholesterol remains normal. Your blood sugar check remains in the \"pre diabetic\" range so continue to work on weight loss with healthy lifestyle changes.  Your kidney and liver tests are all normal. Thank you   Scott Ramos RN

## 2021-07-22 NOTE — PROGRESS NOTES
Please send note: your cholesterol remains normal. Your blood sugar check remains in the \"pre diabetic\" range so continue to work on weight loss with healthy lifestyle changes.  Your kidney and liver tests are all normal. Thank you

## 2022-08-17 DIAGNOSIS — I10 ESSENTIAL HYPERTENSION: ICD-10-CM

## 2022-08-22 RX ORDER — LISINOPRIL AND HYDROCHLOROTHIAZIDE 10; 12.5 MG/1; MG/1
TABLET ORAL
Qty: 90 TABLET | Refills: 0 | OUTPATIENT
Start: 2022-08-22

## 2022-08-24 ENCOUNTER — OFFICE VISIT (OUTPATIENT)
Dept: FAMILY MEDICINE CLINIC | Age: 32
End: 2022-08-24

## 2022-08-24 ENCOUNTER — HOSPITAL ENCOUNTER (OUTPATIENT)
Dept: LAB | Age: 32
Discharge: HOME | End: 2022-08-24

## 2022-08-24 VITALS
SYSTOLIC BLOOD PRESSURE: 138 MMHG | WEIGHT: 201 LBS | HEIGHT: 66 IN | HEART RATE: 94 BPM | BODY MASS INDEX: 32.3 KG/M2 | DIASTOLIC BLOOD PRESSURE: 87 MMHG | TEMPERATURE: 98.2 F

## 2022-08-24 DIAGNOSIS — I10 ESSENTIAL HYPERTENSION: ICD-10-CM

## 2022-08-24 PROCEDURE — 80061 LIPID PANEL: CPT

## 2022-08-24 PROCEDURE — 80053 COMPREHEN METABOLIC PANEL: CPT

## 2022-08-24 PROCEDURE — 83036 HEMOGLOBIN GLYCOSYLATED A1C: CPT

## 2022-08-24 PROCEDURE — 85025 COMPLETE CBC W/AUTO DIFF WBC: CPT

## 2022-08-24 PROCEDURE — 99213 OFFICE O/P EST LOW 20 MIN: CPT | Performed by: FAMILY MEDICINE

## 2022-08-24 RX ORDER — LISINOPRIL AND HYDROCHLOROTHIAZIDE 10; 12.5 MG/1; MG/1
TABLET ORAL
Qty: 90 TABLET | Refills: 3 | Status: SHIPPED | OUTPATIENT
Start: 2022-08-24

## 2022-08-24 NOTE — PROGRESS NOTES
Coordination of Care  1. Have you been to the ER, urgent care clinic since your last visit? Hospitalized since your last visit? No    2. Have you seen or consulted any other health care providers outside of the [address] [place] [address] since your last visit? Include any pap smears or colon screening. No    Does the patient need refills? YES    Learning Assessment Complete?  yes

## 2022-08-24 NOTE — PROGRESS NOTES
HISTORY OF PRESENT ILLNESS  Pushpa Pantoja is a 27 y.o. male. HPI  27year old with hypertension, states he has 5 tablets of medication left, will need refills. No problems, states he is doing well, no chest pains, no shortness of breath  Denies medication side effects  Review of Systems   Constitutional:  Negative for chills, fever and weight loss. Eyes:  Negative for blurred vision, double vision and photophobia. Respiratory:  Negative for cough, sputum production and shortness of breath. Cardiovascular:  Negative for chest pain, palpitations and orthopnea. Gastrointestinal:  Negative for abdominal pain, constipation, diarrhea, heartburn, nausea and vomiting. Genitourinary:  Negative for dysuria and urgency. Musculoskeletal:  Negative for back pain, myalgias and neck pain. Skin:  Negative for rash. Neurological:  Negative for dizziness and headaches. /87 (BP 1 Location: Left upper arm, BP Patient Position: Sitting, BP Cuff Size: Adult)   Pulse 94   Temp 98.2 °F (36.8 °C) (Temporal)   Ht 5' 5.55\" (1.665 m)   Wt 201 lb (91.2 kg)   BMI 32.89 kg/m²   Physical Exam  Constitutional:       General: He is not in acute distress. HENT:      Head: Normocephalic. Right Ear: Tympanic membrane normal.      Left Ear: Tympanic membrane normal.      Nose: Nose normal. No congestion. Mouth/Throat:      Mouth: Mucous membranes are moist.      Pharynx: No oropharyngeal exudate or posterior oropharyngeal erythema. Eyes:      General:         Right eye: No discharge. Left eye: No discharge. Pupils: Pupils are equal, round, and reactive to light. Cardiovascular:      Rate and Rhythm: Normal rate and regular rhythm. Pulses: Normal pulses. Heart sounds: Normal heart sounds. No murmur heard. Pulmonary:      Effort: Pulmonary effort is normal.      Breath sounds: No wheezing, rhonchi or rales.    Abdominal:      General: Bowel sounds are normal. There is no distension. Palpations: Abdomen is soft. Tenderness: There is no abdominal tenderness. Hernia: No hernia is present. Musculoskeletal:         General: No swelling or tenderness. Normal range of motion. Cervical back: Normal range of motion. No rigidity. Neurological:      Mental Status: He is alert. ASSESSMENT and PLAN  Diagnoses and all orders for this visit:    1. Essential hypertension  -     lisinopril-hydroCHLOROthiazide (PRINZIDE, ZESTORETIC) 10-12.5 mg per tablet; Take 1 tablet by mouth once daily  -     CBC WITH AUTOMATED DIFF; Future  -     METABOLIC PANEL, COMPREHENSIVE; Future  -     LIPID PANEL; Future  -     HEMOGLOBIN A1C WITH EAG;  Future  27year old male with well controlled hypertension  Medication refill  Healthy diet and regular physical activity encouraged  We will check labs today

## 2022-08-25 LAB
ALBUMIN SERPL-MCNC: 4.2 G/DL (ref 3.5–5)
ALBUMIN/GLOB SERPL: 1.1 {RATIO} (ref 1.1–2.2)
ALP SERPL-CCNC: 70 U/L (ref 45–117)
ALT SERPL-CCNC: 88 U/L (ref 12–78)
ANION GAP SERPL CALC-SCNC: 5 MMOL/L (ref 5–15)
AST SERPL-CCNC: 35 U/L (ref 15–37)
BASOPHILS # BLD: 0.1 K/UL (ref 0–0.1)
BASOPHILS NFR BLD: 1 % (ref 0–1)
BILIRUB SERPL-MCNC: 0.6 MG/DL (ref 0.2–1)
BUN SERPL-MCNC: 22 MG/DL (ref 6–20)
BUN/CREAT SERPL: 20 (ref 12–20)
CALCIUM SERPL-MCNC: 9.7 MG/DL (ref 8.5–10.1)
CHLORIDE SERPL-SCNC: 106 MMOL/L (ref 97–108)
CHOLEST SERPL-MCNC: 161 MG/DL
CO2 SERPL-SCNC: 28 MMOL/L (ref 21–32)
CREAT SERPL-MCNC: 1.12 MG/DL (ref 0.7–1.3)
DIFFERENTIAL METHOD BLD: NORMAL
EOSINOPHIL # BLD: 0.3 K/UL (ref 0–0.4)
EOSINOPHIL NFR BLD: 4 % (ref 0–7)
ERYTHROCYTE [DISTWIDTH] IN BLOOD BY AUTOMATED COUNT: 12.7 % (ref 11.5–14.5)
EST. AVERAGE GLUCOSE BLD GHB EST-MCNC: 123 MG/DL
GLOBULIN SER CALC-MCNC: 3.7 G/DL (ref 2–4)
GLUCOSE SERPL-MCNC: 117 MG/DL (ref 65–100)
HBA1C MFR BLD: 5.9 % (ref 4–5.6)
HCT VFR BLD AUTO: 44.6 % (ref 36.6–50.3)
HDLC SERPL-MCNC: 54 MG/DL
HDLC SERPL: 3 {RATIO} (ref 0–5)
HGB BLD-MCNC: 15.2 G/DL (ref 12.1–17)
IMM GRANULOCYTES # BLD AUTO: 0 K/UL (ref 0–0.04)
IMM GRANULOCYTES NFR BLD AUTO: 0 % (ref 0–0.5)
LDLC SERPL CALC-MCNC: 79.6 MG/DL (ref 0–100)
LYMPHOCYTES # BLD: 2.1 K/UL (ref 0.8–3.5)
LYMPHOCYTES NFR BLD: 28 % (ref 12–49)
MCH RBC QN AUTO: 29.6 PG (ref 26–34)
MCHC RBC AUTO-ENTMCNC: 34.1 G/DL (ref 30–36.5)
MCV RBC AUTO: 86.9 FL (ref 80–99)
MONOCYTES # BLD: 0.7 K/UL (ref 0–1)
MONOCYTES NFR BLD: 10 % (ref 5–13)
NEUTS SEG # BLD: 4.1 K/UL (ref 1.8–8)
NEUTS SEG NFR BLD: 57 % (ref 32–75)
NRBC # BLD: 0 K/UL (ref 0–0.01)
NRBC BLD-RTO: 0 PER 100 WBC
PLATELET # BLD AUTO: 337 K/UL (ref 150–400)
PMV BLD AUTO: 9.4 FL (ref 8.9–12.9)
POTASSIUM SERPL-SCNC: 3.9 MMOL/L (ref 3.5–5.1)
PROT SERPL-MCNC: 7.9 G/DL (ref 6.4–8.2)
RBC # BLD AUTO: 5.13 M/UL (ref 4.1–5.7)
SODIUM SERPL-SCNC: 139 MMOL/L (ref 136–145)
TRIGL SERPL-MCNC: 137 MG/DL (ref ?–150)
VLDLC SERPL CALC-MCNC: 27.4 MG/DL
WBC # BLD AUTO: 7.3 K/UL (ref 4.1–11.1)

## 2022-08-25 NOTE — PROGRESS NOTES
Similar labs to last year  Hemoglobin a1c is 5.9% which is prediabetes  Normal blood count, normal kidney function, liver function and electrolytes  Normal cholesterol  Patient can be informed

## 2022-09-27 NOTE — PROGRESS NOTES
Tc to the pt today to give the lab results message to him. I called him 2x. The first call disconnected, the pt answered the 2nd call and verified his name and . He was given the lab result message from the provider, he was recommended on healthy diet and exercise for lowering A1c. He verbalized understanding. He was offered an , refused needing an . spoke [place].  Leatha Muniz RN

## 2023-05-26 RX ORDER — LISINOPRIL AND HYDROCHLOROTHIAZIDE 12.5; 1 MG/1; MG/1
1 TABLET ORAL DAILY
COMMUNITY
Start: 2022-08-24

## 2023-09-14 RX ORDER — LISINOPRIL AND HYDROCHLOROTHIAZIDE 12.5; 1 MG/1; MG/1
1 TABLET ORAL DAILY
Qty: 90 TABLET | Refills: 0 | Status: SHIPPED | OUTPATIENT
Start: 2023-09-14

## 2023-09-27 ENCOUNTER — HOSPITAL ENCOUNTER (OUTPATIENT)
Facility: HOSPITAL | Age: 33
Setting detail: SPECIMEN
Discharge: HOME | End: 2023-09-30

## 2023-09-27 PROCEDURE — 36415 COLL VENOUS BLD VENIPUNCTURE: CPT

## 2023-09-27 PROCEDURE — 83036 HEMOGLOBIN GLYCOSYLATED A1C: CPT

## 2023-09-27 PROCEDURE — 80053 COMPREHEN METABOLIC PANEL: CPT

## 2023-09-27 PROCEDURE — 80061 LIPID PANEL: CPT

## 2023-09-28 LAB
ALBUMIN SERPL-MCNC: 4.2 G/DL (ref 3.5–5)
ALBUMIN/GLOB SERPL: 1.3 (ref 1.1–2.2)
ALP SERPL-CCNC: 80 U/L (ref 45–117)
ALT SERPL-CCNC: 65 U/L (ref 12–78)
ANION GAP SERPL CALC-SCNC: 5 MMOL/L (ref 5–15)
AST SERPL-CCNC: 23 U/L (ref 15–37)
BILIRUB SERPL-MCNC: 0.4 MG/DL (ref 0.2–1)
BUN SERPL-MCNC: 18 MG/DL (ref 6–20)
BUN/CREAT SERPL: 16 (ref 12–20)
CALCIUM SERPL-MCNC: 9.5 MG/DL (ref 8.5–10.1)
CHLORIDE SERPL-SCNC: 106 MMOL/L (ref 97–108)
CHOLEST SERPL-MCNC: 162 MG/DL
CO2 SERPL-SCNC: 28 MMOL/L (ref 21–32)
CREAT SERPL-MCNC: 1.14 MG/DL (ref 0.7–1.3)
EST. AVERAGE GLUCOSE BLD GHB EST-MCNC: 120 MG/DL
GLOBULIN SER CALC-MCNC: 3.3 G/DL (ref 2–4)
GLUCOSE SERPL-MCNC: 108 MG/DL (ref 65–100)
HBA1C MFR BLD: 5.8 % (ref 4–5.6)
HDLC SERPL-MCNC: 49 MG/DL
HDLC SERPL: 3.3 (ref 0–5)
LDLC SERPL CALC-MCNC: 83.4 MG/DL (ref 0–100)
POTASSIUM SERPL-SCNC: 4.8 MMOL/L (ref 3.5–5.1)
PROT SERPL-MCNC: 7.5 G/DL (ref 6.4–8.2)
SODIUM SERPL-SCNC: 139 MMOL/L (ref 136–145)
TRIGL SERPL-MCNC: 148 MG/DL
VLDLC SERPL CALC-MCNC: 29.6 MG/DL

## 2024-02-21 ENCOUNTER — OFFICE VISIT (OUTPATIENT)
Age: 34
End: 2024-02-21

## 2024-02-21 VITALS
HEART RATE: 73 BPM | DIASTOLIC BLOOD PRESSURE: 72 MMHG | BODY MASS INDEX: 31.6 KG/M2 | WEIGHT: 196.6 LBS | HEIGHT: 66 IN | TEMPERATURE: 98.6 F | SYSTOLIC BLOOD PRESSURE: 118 MMHG | OXYGEN SATURATION: 97 %

## 2024-02-21 DIAGNOSIS — R73.03 PRE-DIABETES: ICD-10-CM

## 2024-02-21 DIAGNOSIS — Z23 NEEDS FLU SHOT: Primary | ICD-10-CM

## 2024-02-21 DIAGNOSIS — I10 PRIMARY HYPERTENSION: ICD-10-CM

## 2024-02-21 PROCEDURE — 99213 OFFICE O/P EST LOW 20 MIN: CPT | Performed by: PHYSICIAN ASSISTANT

## 2024-02-21 PROCEDURE — 90686 IIV4 VACC NO PRSV 0.5 ML IM: CPT | Performed by: PHYSICIAN ASSISTANT

## 2024-02-21 PROCEDURE — 3074F SYST BP LT 130 MM HG: CPT | Performed by: PHYSICIAN ASSISTANT

## 2024-02-21 PROCEDURE — 90471 IMMUNIZATION ADMIN: CPT | Performed by: PHYSICIAN ASSISTANT

## 2024-02-21 PROCEDURE — 3078F DIAST BP <80 MM HG: CPT | Performed by: PHYSICIAN ASSISTANT

## 2024-02-21 RX ORDER — LISINOPRIL AND HYDROCHLOROTHIAZIDE 12.5; 1 MG/1; MG/1
1 TABLET ORAL DAILY
Qty: 90 TABLET | Refills: 3 | Status: SHIPPED | OUTPATIENT
Start: 2024-02-21

## 2024-02-21 SDOH — ECONOMIC STABILITY: HOUSING INSECURITY
IN THE LAST 12 MONTHS, WAS THERE A TIME WHEN YOU DID NOT HAVE A STEADY PLACE TO SLEEP OR SLEPT IN A SHELTER (INCLUDING NOW)?: NO

## 2024-02-21 SDOH — ECONOMIC STABILITY: FOOD INSECURITY: WITHIN THE PAST 12 MONTHS, YOU WORRIED THAT YOUR FOOD WOULD RUN OUT BEFORE YOU GOT MONEY TO BUY MORE.: NEVER TRUE

## 2024-02-21 SDOH — ECONOMIC STABILITY: INCOME INSECURITY: HOW HARD IS IT FOR YOU TO PAY FOR THE VERY BASICS LIKE FOOD, HOUSING, MEDICAL CARE, AND HEATING?: NOT VERY HARD

## 2024-02-21 SDOH — ECONOMIC STABILITY: FOOD INSECURITY: WITHIN THE PAST 12 MONTHS, THE FOOD YOU BOUGHT JUST DIDN'T LAST AND YOU DIDN'T HAVE MONEY TO GET MORE.: NEVER TRUE

## 2024-02-21 ASSESSMENT — PATIENT HEALTH QUESTIONNAIRE - PHQ9
2. FEELING DOWN, DEPRESSED OR HOPELESS: 0
SUM OF ALL RESPONSES TO PHQ QUESTIONS 1-9: 0
SUM OF ALL RESPONSES TO PHQ QUESTIONS 1-9: 0
DEPRESSION UNABLE TO ASSESS: FUNCTIONAL CAPACITY MOTIVATION LIMITS ACCURACY
1. LITTLE INTEREST OR PLEASURE IN DOING THINGS: 0
SUM OF ALL RESPONSES TO PHQ9 QUESTIONS 1 & 2: 0
SUM OF ALL RESPONSES TO PHQ QUESTIONS 1-9: 0
SUM OF ALL RESPONSES TO PHQ QUESTIONS 1-9: 0

## 2024-02-21 NOTE — PROGRESS NOTES
Selwyn Mijares seen at discharge. Full name and  verified; After visit Summary was given. RN reviewed today's visit with patient, as well as instructions on when it is recommended to return for follow-up visit in 6 months. RN reviewed the provider's instructions with the patient, answering all questions to his satisfaction. Patient verbalized understanding. Good Rx coupon(s) provided to patient for the prescribed medication(s). Selwyn Mijares requests flu vaccine. Provider cleared patient to receive vaccines today. Consent form completed. Patient confirmed name and . Patient denies fever, egg allergy, or reactions to any past immunization.  Immunization given per protocol and recorded in [facility] and EMR.  Vaccine Information Sheet given to patient and possible side effects explained.  Reviewed signs/symptoms of allergic reaction indicating need to be seen in ER.  Patient verbalized understanding. Vaccine administered in Left deltoid. Patient monitored in clinic for 15 minutes after vaccine administration. Patient had no adverse reaction at time of discharge.   used during this encounter.  Damián Sevilla RN     
\"Have you been to the ER, urgent care clinic since your last visit?  Hospitalized since your last visit?\"    YES - When: approximately 2 months ago.  Where and Why: Stomach pain  at [facility] [address].    “Have you seen or consulted any other health care providers outside of [facility] since your last visit?”    NO          
  SpO2 97%   BMI 31.86 kg/m²     A&Ox3  WDWN NAD  Respirations normal and non labored